# Patient Record
Sex: MALE | Race: OTHER | NOT HISPANIC OR LATINO
[De-identification: names, ages, dates, MRNs, and addresses within clinical notes are randomized per-mention and may not be internally consistent; named-entity substitution may affect disease eponyms.]

---

## 2018-04-19 ENCOUNTER — APPOINTMENT (OUTPATIENT)
Dept: MRI IMAGING | Facility: CLINIC | Age: 23
End: 2018-04-19
Payer: COMMERCIAL

## 2018-04-19 ENCOUNTER — OUTPATIENT (OUTPATIENT)
Dept: OUTPATIENT SERVICES | Facility: HOSPITAL | Age: 23
LOS: 1 days | End: 2018-04-19
Payer: COMMERCIAL

## 2018-04-19 DIAGNOSIS — S83.232A COMPLEX TEAR OF MEDIAL MENISCUS, CURRENT INJURY, LEFT KNEE, INITIAL ENCOUNTER: ICD-10-CM

## 2018-04-19 PROCEDURE — 73721 MRI JNT OF LWR EXTRE W/O DYE: CPT

## 2018-04-19 PROCEDURE — 73721 MRI JNT OF LWR EXTRE W/O DYE: CPT | Mod: 26,LT

## 2018-07-13 ENCOUNTER — APPOINTMENT (OUTPATIENT)
Dept: INTERNAL MEDICINE | Facility: CLINIC | Age: 23
End: 2018-07-13
Payer: COMMERCIAL

## 2018-07-13 VITALS
TEMPERATURE: 97.8 F | HEIGHT: 70.8 IN | DIASTOLIC BLOOD PRESSURE: 74 MMHG | HEART RATE: 66 BPM | BODY MASS INDEX: 25.34 KG/M2 | WEIGHT: 181 LBS | SYSTOLIC BLOOD PRESSURE: 112 MMHG | OXYGEN SATURATION: 99 %

## 2018-07-13 DIAGNOSIS — L70.9 ACNE, UNSPECIFIED: ICD-10-CM

## 2018-07-13 DIAGNOSIS — Z87.898 PERSONAL HISTORY OF OTHER SPECIFIED CONDITIONS: ICD-10-CM

## 2018-07-13 DIAGNOSIS — Z00.00 ENCOUNTER FOR GENERAL ADULT MEDICAL EXAMINATION W/OUT ABNORMAL FINDINGS: ICD-10-CM

## 2018-07-13 DIAGNOSIS — Z86.79 PERSONAL HISTORY OF OTHER DISEASES OF THE CIRCULATORY SYSTEM: ICD-10-CM

## 2018-07-13 DIAGNOSIS — I42.2 OTHER HYPERTROPHIC CARDIOMYOPATHY: ICD-10-CM

## 2018-07-13 PROCEDURE — 99385 PREV VISIT NEW AGE 18-39: CPT

## 2018-07-13 RX ORDER — ISOTRETINOIN 30 MG/1
CAPSULE, GELATIN COATED ORAL
Refills: 0 | Status: ACTIVE | COMMUNITY

## 2018-07-13 NOTE — PHYSICAL EXAM

## 2018-07-13 NOTE — HISTORY OF PRESENT ILLNESS
[FreeTextEntry1] : establish care [de-identified] : Presents to establish care, for general check-up. Feels well today, no acute complaints. Has hx mild HOCM, follows with cardiologist annually in [place], [place]. Denies CP, SOB, palpitations. Has hx acne, takes accutane. Had blood work drawn last month, all ok except for glucose 99. Fam hx DM on father's side.

## 2018-07-13 NOTE — ASSESSMENT
[FreeTextEntry1] : \par 22 year old man with acne, HOCM presents to establish care.\par \par 1. Acne: on Accutane, sees derm monthly; had blood work drawn last month, all ok\par \par 2. HOCM: follows with cardiologist in [place] annually; will have records sent to our office for review\par \par 3. HCM: STD screening with labs; check A1c today given fam hx

## 2018-07-16 LAB
C TRACH RRNA SPEC QL NAA+PROBE: NOT DETECTED
HBA1C MFR BLD HPLC: 5.4 %
HCV AB SER QL: NONREACTIVE
HCV S/CO RATIO: 0.09 S/CO
HIV1+2 AB SPEC QL IA.RAPID: NONREACTIVE
N GONORRHOEA RRNA SPEC QL NAA+PROBE: NOT DETECTED
SOURCE AMPLIFICATION: NORMAL
T PALLIDUM AB SER QL IA: NEGATIVE

## 2021-02-22 ENCOUNTER — TRANSCRIPTION ENCOUNTER (OUTPATIENT)
Age: 26
End: 2021-02-22

## 2021-03-22 ENCOUNTER — TRANSCRIPTION ENCOUNTER (OUTPATIENT)
Age: 26
End: 2021-03-22